# Patient Record
Sex: FEMALE | Race: WHITE | HISPANIC OR LATINO | Employment: FULL TIME | URBAN - METROPOLITAN AREA
[De-identification: names, ages, dates, MRNs, and addresses within clinical notes are randomized per-mention and may not be internally consistent; named-entity substitution may affect disease eponyms.]

---

## 2021-09-13 PROCEDURE — 99203 OFFICE O/P NEW LOW 30 MIN: CPT | Performed by: EMERGENCY MEDICINE

## 2021-09-14 ENCOUNTER — HOSPITAL ENCOUNTER (EMERGENCY)
Facility: HOSPITAL | Age: 21
Discharge: HOME | End: 2021-09-14
Attending: EMERGENCY MEDICINE | Admitting: EMERGENCY MEDICINE

## 2021-09-14 VITALS
HEIGHT: 64 IN | HEART RATE: 109 BPM | RESPIRATION RATE: 16 BRPM | SYSTOLIC BLOOD PRESSURE: 128 MMHG | TEMPERATURE: 98.2 F | OXYGEN SATURATION: 97 % | DIASTOLIC BLOOD PRESSURE: 73 MMHG | BODY MASS INDEX: 24.75 KG/M2 | WEIGHT: 145 LBS

## 2021-09-14 DIAGNOSIS — L02.31 LEFT BUTTOCK ABSCESS: Primary | ICD-10-CM

## 2021-09-14 PROCEDURE — 87205 SMEAR GRAM STAIN: CPT | Performed by: PHYSICIAN ASSISTANT

## 2021-09-14 PROCEDURE — 87070 CULTURE OTHR SPECIMN AEROBIC: CPT | Performed by: PHYSICIAN ASSISTANT

## 2021-09-14 PROCEDURE — 99283 EMERGENCY DEPT VISIT LOW MDM: CPT

## 2021-09-14 RX ORDER — CEPHALEXIN 500 MG/1
500 CAPSULE ORAL 3 TIMES DAILY
Qty: 30 CAPSULE | Refills: 0 | Status: SHIPPED | OUTPATIENT
Start: 2021-09-14 | End: 2021-09-29

## 2021-09-14 RX ORDER — CIPROFLOXACIN 500 MG/1
500 TABLET, FILM COATED ORAL 2 TIMES DAILY
COMMUNITY
End: 2021-09-14

## 2021-09-14 RX ORDER — SULFAMETHOXAZOLE AND TRIMETHOPRIM 800; 160 MG/1; MG/1
2 TABLET ORAL 2 TIMES DAILY
Qty: 40 TABLET | Refills: 0 | Status: SHIPPED | OUTPATIENT
Start: 2021-09-14 | End: 2021-09-29

## 2021-09-14 RX ORDER — ACETAMINOPHEN AND CODEINE PHOSPHATE 300; 30 MG/1; MG/1
1 TABLET ORAL EVERY 4 HOURS PRN
COMMUNITY
End: 2021-09-16

## 2021-09-14 RX ORDER — SULFAMETHOXAZOLE AND TRIMETHOPRIM 800; 160 MG/1; MG/1
1 TABLET ORAL 2 TIMES DAILY
COMMUNITY
End: 2021-09-14

## 2021-09-14 RX ORDER — LIDOCAINE HYDROCHLORIDE AND EPINEPHRINE 10; 10 MG/ML; UG/ML
10 INJECTION, SOLUTION INFILTRATION; PERINEURAL ONCE
Status: COMPLETED | OUTPATIENT
Start: 2021-09-14 | End: 2021-09-14

## 2021-09-14 RX ADMIN — LIDOCAINE HYDROCHLORIDE,EPINEPHRINE BITARTRATE 10 ML: 10; .01 INJECTION, SOLUTION INFILTRATION; PERINEURAL at 21:25

## 2021-09-15 NOTE — DISCHARGE INSTRUCTIONS
If you are unable to secure appointment with the surgeon above within the next 2 to 3 days, recommend returning to the emergency department here for a wound recheck and packing removal.    Return to the emergency department should you develop a fever greater than 101 °F, should the area become more swollen and painful, or should you have any further concerns.

## 2021-09-15 NOTE — ED PROVIDER NOTES
EMERGENCY DEPARTMENT ENCOUNTER    Room Number:  A01/01  Date of encounter:  9/19/2021  PCP: Provider, No Known  Historian: Patient      HPI:  Chief Complaint: Left buttock abscess   A complete HPI/ROS/PMH/PSH/SH/FH are unobtainable due to: Nothing    Context: Gomez Villegas is a 21 y.o. female who presents to the ED c/o an abscess to the left buttock that began approximately a week ago.  Patient stated started mild and is gradually progressed since that time.  She was seen a couple days ago at [facility] and prescribed Cipro 500 twice daily as well as Bactrim DS 1 tablet twice daily for 7 days.  Since that time her abscess grown become more painful.  She denies associated fever.  She informs me this is happened once in the past and had to be drained.  She is here for further evaluation.      PAST MEDICAL HISTORY  Active Ambulatory Problems     Diagnosis Date Noted   • No Active Ambulatory Problems     Resolved Ambulatory Problems     Diagnosis Date Noted   • No Resolved Ambulatory Problems     No Additional Past Medical History         PAST SURGICAL HISTORY  Past Surgical History:   Procedure Laterality Date   • PILONIDAL CYSTECTOMY  06/2015         FAMILY HISTORY  Family History   Problem Relation Age of Onset   • Diabetes Father          SOCIAL HISTORY  Social History     Socioeconomic History   • Marital status: Single     Spouse name: Not on file   • Number of children: Not on file   • Years of education: Not on file   • Highest education level: Not on file   Tobacco Use   • Smoking status: Never Smoker   • Smokeless tobacco: Never Used   Vaping Use   • Vaping Use: Never used   Substance and Sexual Activity   • Alcohol use: Not Currently   • Drug use: Never   • Sexual activity: Defer         ALLERGIES  Patient has no known allergies.        REVIEW OF SYSTEMS  Review of Systems   Constitutional: Negative for chills and fever.   HENT: Negative.    Eyes: Negative.    Respiratory: Negative for cough and  shortness of breath.    Cardiovascular: Negative for chest pain and palpitations.   Gastrointestinal: Negative for abdominal pain, nausea and vomiting.   Genitourinary: Negative.    Musculoskeletal: Negative.    Skin:        Abscess Left Buttock   Neurological: Negative.    Hematological: Negative.    Psychiatric/Behavioral: Negative.         All systems reviewed and negative except for those discussed in HPI.       PHYSICAL EXAM    I have reviewed the triage vital signs and nursing notes.    ED Triage Vitals [09/14/21 2003]   Temp Heart Rate Resp BP SpO2   98.2 °F (36.8 °C) (!) 130 16 159/84 97 %      Temp src Heart Rate Source Patient Position BP Location FiO2 (%)   Temporal Monitor Standing Right arm --       Physical Exam  GENERAL: Well-nourished, nontoxic, no acute distress  HENT: nares patent  EYES: no scleral icterus  CV: regular rhythm, regular rate, no rubs or gallops  RESPIRATORY: normal effort  ABDOMEN: soft  MUSCULOSKELETAL: no deformity  NEURO: alert and oriented x4, moves all extremities, follows commands  SKIN: 4 cm area of fluctuance surrounded by mild erythema noted on the patient's left buttocks.        LAB RESULTS  No results found for this or any previous visit (from the past 24 hour(s)).    Ordered the above labs and independently reviewed the results.        RADIOLOGY  No Radiology Exams Resulted Within Past 24 Hours    I ordered the above noted radiological studies. Reviewed by me and discussed with radiologist.  See dictation for official radiology interpretation.      PROCEDURES    Incision & Drainage    Date/Time: 9/14/2021 10:22 PM  Performed by: Ryenaldo Díaz III, PA  Authorized by: Martina Dutta MD     Consent:     Consent obtained:  Verbal    Consent given by:  Patient    Risks discussed:  Incomplete drainage and bleeding  Location:     Type:  Abscess    Size:  4    Location:  Trunk (Left buttock)  Pre-procedure details:     Skin preparation:  Betadine  Procedure details:      Needle aspiration: no      Incision types:  Stab incision    Incision depth:  Dermal    Scalpel blade:  11    Wound management:  Probed and deloculated    Drainage:  Purulent    Drainage amount:  Copious    Wound treatment:  Wound left open    Packing materials:  1/4 in iodoform gauze  Post-procedure details:     Patient tolerance of procedure:  Tolerated well, no immediate complications          MEDICATIONS GIVEN IN ER    Medications   lidocaine 1% - EPINEPHrine 1:241077 (XYLOCAINE W/EPI) 1 %-1:348116 injection 10 mL (10 mL Injection Given by Other 9/14/21 2125)         PROGRESS, DATA ANALYSIS, CONSULTS, AND MEDICAL DECISION MAKING    All labs have been independently reviewed by me.  All radiology studies have been reviewed by me and discussed with radiologist dictating the report.   EKG's independently viewed and interpreted by me.  Discussion below represents my analysis of pertinent findings related to patient's condition, differential diagnosis, treatment plan and final disposition.    Working diagnosis is left buttock abscess.  Please see procedure note for details of the I&D         Patient was placed in face mask in first look. Patient was wearing facemask when I entered the room and throughout our encounter. I wore full protective equipment throughout this patient encounter including a face mask, eye shield, gown and gloves. Hand hygiene was performed before donning protective equipment and after removal when leaving the room.    AS OF 10:18 EDT VITALS:    BP - 128/73  HR - 109  TEMP - 98.2 °F (36.8 °C) (Temporal)  O2 SATS - 97%        DIAGNOSIS  Final diagnoses:   Left buttock abscess         DISPOSITION  DISCHARGE    Patient discharged in stable condition.    Reviewed implications of results, diagnosis, meds, responsibility to follow up, warning signs and symptoms of possible worsening, potential complications and reasons to return to ER..    Patient/Family voiced understanding of above  instructions.    Discussed plan for discharge, as there is no emergent indication for admission. Patient referred to primary care provider for BP management due to today's BP. Pt/family is agreeable and understands need for follow up and repeat testing.  Pt is aware that discharge does not mean that nothing is wrong but it indicates no emergency is present that requires admission and they must continue care with follow-up as given below or physician of their choice.     FOLLOW-UP  Aamir Upton MD  [address] [address]  [phone]    In 2 days  For wound re-check, For further evaluation and treatment         Medication List      New Prescriptions    cephalexin 500 MG capsule  Commonly known as: KEFLEX  Take 1 capsule by mouth 3 (Three) Times a Day.     diclofenac 50 MG EC tablet  Commonly known as: VOLTAREN  Take 1 tablet by mouth 3 (Three) Times a Day. For pain        Changed    sulfamethoxazole-trimethoprim 800-160 MG per tablet  Commonly known as: BACTRIM DS,SEPTRA DS  Take 2 tablets by mouth 2 (Two) Times a Day.  What changed: how much to take        Stop    ciprofloxacin 500 MG tablet  Commonly known as: CIPRO           Where to Get Your Medications      You can get these medications from any pharmacy    Bring a paper prescription for each of these medications  · cephalexin 500 MG capsule  · diclofenac 50 MG EC tablet  · sulfamethoxazole-trimethoprim 800-160 MG per tablet                Reynaldo Díaz III, PA  09/14/21 4405       Reynaldo Díaz III, PA  09/19/21 7509

## 2021-09-15 NOTE — ED PROVIDER NOTES
The YARELI and I have discussed this patient's history, physical exam, and treatment plan. I have reviewed the documentation and personally had a face to face interaction with the patient  I affirm the documentation and agree with the treatment and plan.  The following describes my personal findings.    The patient presents complaining of pain and swelling to the buttock for 1 week similar to previous.  Patient reports she has been taking Cipro and penicillin twice daily for 7 days without relief.  Patient denies fever, abdominal pain, nausea/vomiting.    Limited physical exam:  Patient is nontoxic appearing awake, alert, conversant, post I&D of abscess  Lungs/cardiovascular: Nontachypneic, nontachycardic  Back/extremities: Tender to palpation superior aspect of gluteal fold without significant cellulitis or erythema, post I&D, area clean and dry    Patient voices understanding of need to change medications, take probiotics and drink plenty of fluids and follow closely with primary care provider or other specialist for recheck, further testing, treatment as needed.    Patient was wearing facemask when I entered the room and throughout our encounter. Full protective equipment was worn throughout this patient encounter including a face mask, eye protection and gloves. Hand hygiene was performed before donning protective equipment and after removal when leaving the room.           Martina Dutta MD  09/15/21 0019

## 2021-09-15 NOTE — ED NOTES
Pt ambulatory to triage from home with c/o abscess on right buttock.  Symptoms began last week.  Went to urgent care, put on antibiotics (currently on them), but pt states is getting more painful.  Pt wearing mask in triage.  Triage personnel wore appropriate PPE       Leslie Leyva RN  09/14/21 2006

## 2021-09-16 ENCOUNTER — OFFICE VISIT (OUTPATIENT)
Dept: SURGERY | Facility: CLINIC | Age: 21
End: 2021-09-16

## 2021-09-16 VITALS — HEIGHT: 64 IN | BODY MASS INDEX: 24.75 KG/M2 | WEIGHT: 145 LBS

## 2021-09-16 DIAGNOSIS — L05.91 PILONIDAL CYST: Primary | ICD-10-CM

## 2021-09-16 PROCEDURE — 99203 OFFICE O/P NEW LOW 30 MIN: CPT | Performed by: SURGERY

## 2021-09-16 NOTE — PROGRESS NOTES
Cc: Recurrent pilonidal cyst    History of presenting illness:   This is a quite nice and healthy 21-year-old female with a history of incision and drainage of a pilonidal cyst done by Dr. Barbie Resendiz in 2015 who did quite well after that.  She says she had entirely forgotten about it until last week when she skye have some swelling and pain.  This prompted a call to her family physician who sent her in some antibiotics, but the pain became worse and she presented to the emergency department here at [facility] with this infection.  This was drained in the emergency department and was packed.  The patient does feel much better.  She is still having some drainage but the discomfort is much improved.  She continues to take Keflex.    Past Medical History: Negative    Past Surgical History: Negative outside of incision and drainage of pilonidal cyst x2.  The surgical record here suggest pilonidal cystectomy, but review of the op report from 2015 contradicts this.    Medications: Currently on Keflex no medications chronically    Allergies: None known    Social History: Non-smoker, currently in school and working    Family History: Negative for colorectal cancer, positive for diabetes    Review of Systems:  Constitutional: Positive for subjective chills and fever last week, currently resolved, negative for change in appetite  Neck: no swollen glands or dysphagia or odynophagia  Respiratory: negative for SOB, cough, hemoptysis or wheezing  Cardiovascular: negative for chest pain, palpitations or peripheral edema  Gastrointestinal: Negative for nausea, vomiting, abdominal pain      Physical Exam:  BMI: 24.9  General: alert and oriented, appropriate, no acute distress  Eyes: No scleral icterus, extraocular movements are intact  Neck: Supple without lymphadenopathy or thyromegaly, trachea is in the midline  Respiratory: There is good bilateral chest expansion, no use of accessory muscles is noted  Cardiovascular: No jugular  venous distention or peripheral edema is seen  Gastrointestinal: Soft and benign, no mass or hernia  Rectal: External anal exam normal.  At the superior gluteal cleft, just to the right of midline there is an opening consistent with abscess and pilonidal cyst drainage with some purulent material.  I removed the packing and washed this out with peroxide.  I replaced the packing with 1/4 inch iodoform gauze.  There are a couple of sinus openings in the midline below this.    Laboratory data: Wound culture with no growth to this point    Imaging data: None    Operative report from 2015 is in the chart and reviewed.  It does not describe a pilonidal cystectomy, rather an incision and drainage was performed.      Assessment and plan:   -Recurrent pilonidal cyst, status post drainage, looks well at this time  -Packing removed and replaced and washed out.  Patient will remove packing on Saturday (2 days from now).  -She will follow-up in 2 weeks, we will discuss further whether or not she would want to proceed with formal pilonidal cystectomy.  Since it has been quite a long time since it is flared up, it may be reasonable to observe.  We did begin a discussion of some of the complications which can be associated with pilonidal cystectomy, although her body habitus is favorable.      Aamir Upton MD, FACS  General, Minimally Invasive and Endoscopic Surgery  [facility]    [address], [address]  [place], [place], [address]  P: [phone]  F: [phone]

## 2021-09-18 LAB
BACTERIA SPEC AEROBE CULT: NORMAL
GRAM STN SPEC: NORMAL
GRAM STN SPEC: NORMAL

## 2021-09-29 ENCOUNTER — OFFICE VISIT (OUTPATIENT)
Dept: SURGERY | Facility: CLINIC | Age: 21
End: 2021-09-29

## 2021-09-29 VITALS — BODY MASS INDEX: 25.61 KG/M2 | HEIGHT: 64 IN | WEIGHT: 150 LBS

## 2021-09-29 DIAGNOSIS — L05.91 PILONIDAL CYST: Primary | ICD-10-CM

## 2021-09-29 PROCEDURE — 99213 OFFICE O/P EST LOW 20 MIN: CPT | Performed by: SURGERY

## 2021-09-29 NOTE — PROGRESS NOTES
Follow-up pilonidal cyst    This is a nice 21-year-old female who had a pilonidal abscess drained by Dr. Resendiz around 6 years ago when the patient was around 15.  Since that time she had no troubles, until recently when she returned to the emergency department and had it drained again.  I saw her a couple of weeks ago and it was looking much better, we repacked the wound and now it is completely healed.  She has minimal tenderness at the site and has had no further drainage.    Review of systems:  Constitutional: Negative for fever or chills  Respiratory: Negative for cough or wheezing    Objective:  Body mass index 25.7, weight 150 pounds  General: Awake and alert, no distress  Eyes: Extraocular movements are intact with no icterus  Neck: Supple, trachea midline  Respiratory: No use of accessory muscles, good bilateral chest expansion  Gastrointestinal: Abdomen soft benign, no mass or hernia  Rectal: Normal external anal exam, there are several sinus openings in the upper portion of the gluteal cleft, the previous area of drainage to the right of midline is completely healed and is soft and minimally tender.    Assessment and plan:  -Recurrent pilonidal cyst, status post incision and drainage, now recovered  -Overall patient is feeling well.  At this time since its been 6 years since her last recurrence, she prefers observation which I think is reasonable.  If she has a recurrence again fairly soon, at that point I think consideration of formal pilonidal cystectomy would be best, if she is able to go several years again without another recurrence, then continued drainage or symptomatic management may be best.  -No need for further antibiotics at this time  -Follow-up on an as-needed basis    Aamir Upton MD  General and Endoscopic Surgery  [facility], [place]    [facility], [address]  [place], [place], [address]  P: [phone]  F: [phone]